# Patient Record
Sex: FEMALE | Race: WHITE | NOT HISPANIC OR LATINO | Employment: UNEMPLOYED | URBAN - METROPOLITAN AREA
[De-identification: names, ages, dates, MRNs, and addresses within clinical notes are randomized per-mention and may not be internally consistent; named-entity substitution may affect disease eponyms.]

---

## 2018-05-21 NOTE — PRE-PROCEDURE INSTRUCTIONS
Pre-Surgery Instructions:   Medication Instructions    norethindrone-ethinyl estradiol-iron (ESTROSTEP FE) 1-20/1-30/1-35 MG-MCG TABS Instructed patient per Anesthesia Guidelines      Pre op instructions reviewed-showering instructions using an antibacterial soap reviewed

## 2018-06-06 ENCOUNTER — ANESTHESIA EVENT (OUTPATIENT)
Dept: PERIOP | Facility: HOSPITAL | Age: 23
End: 2018-06-06
Payer: COMMERCIAL

## 2018-06-07 ENCOUNTER — HOSPITAL ENCOUNTER (OUTPATIENT)
Facility: HOSPITAL | Age: 23
Setting detail: OUTPATIENT SURGERY
Discharge: HOME | End: 2018-06-07
Attending: OTOLARYNGOLOGY | Admitting: OTOLARYNGOLOGY
Payer: COMMERCIAL

## 2018-06-07 ENCOUNTER — ANESTHESIA (OUTPATIENT)
Dept: PERIOP | Facility: HOSPITAL | Age: 23
End: 2018-06-07
Payer: COMMERCIAL

## 2018-06-07 VITALS
OXYGEN SATURATION: 96 % | HEART RATE: 85 BPM | TEMPERATURE: 98.5 F | SYSTOLIC BLOOD PRESSURE: 123 MMHG | RESPIRATION RATE: 18 BRPM | BODY MASS INDEX: 23.92 KG/M2 | HEIGHT: 62 IN | WEIGHT: 130 LBS | DIASTOLIC BLOOD PRESSURE: 70 MMHG

## 2018-06-07 DIAGNOSIS — J34.89 NASAL OBSTRUCTION: Primary | ICD-10-CM

## 2018-06-07 LAB — EXT PREGNANCY TEST URINE: NEGATIVE

## 2018-06-07 PROCEDURE — 81025 URINE PREGNANCY TEST: CPT | Performed by: STUDENT IN AN ORGANIZED HEALTH CARE EDUCATION/TRAINING PROGRAM

## 2018-06-07 RX ORDER — FENTANYL CITRATE/PF 50 MCG/ML
50 SYRINGE (ML) INJECTION
Status: DISCONTINUED | OUTPATIENT
Start: 2018-06-07 | End: 2018-06-07 | Stop reason: HOSPADM

## 2018-06-07 RX ORDER — MIDAZOLAM HYDROCHLORIDE 1 MG/ML
INJECTION INTRAMUSCULAR; INTRAVENOUS AS NEEDED
Status: DISCONTINUED | OUTPATIENT
Start: 2018-06-07 | End: 2018-06-07 | Stop reason: SURG

## 2018-06-07 RX ORDER — PROPOFOL 10 MG/ML
INJECTION, EMULSION INTRAVENOUS AS NEEDED
Status: DISCONTINUED | OUTPATIENT
Start: 2018-06-07 | End: 2018-06-07 | Stop reason: SURG

## 2018-06-07 RX ORDER — ACETAMINOPHEN 160 MG/5ML
650 SUSPENSION, ORAL (FINAL DOSE FORM) ORAL ONCE
Status: DISCONTINUED | OUTPATIENT
Start: 2018-06-07 | End: 2018-06-07 | Stop reason: HOSPADM

## 2018-06-07 RX ORDER — FENTANYL CITRATE 50 UG/ML
INJECTION, SOLUTION INTRAMUSCULAR; INTRAVENOUS AS NEEDED
Status: DISCONTINUED | OUTPATIENT
Start: 2018-06-07 | End: 2018-06-07 | Stop reason: SURG

## 2018-06-07 RX ORDER — ONDANSETRON 2 MG/ML
4 INJECTION INTRAMUSCULAR; INTRAVENOUS ONCE AS NEEDED
Status: DISCONTINUED | OUTPATIENT
Start: 2018-06-07 | End: 2018-06-07 | Stop reason: HOSPADM

## 2018-06-07 RX ORDER — PROMETHAZINE HYDROCHLORIDE 25 MG/ML
25 INJECTION, SOLUTION INTRAMUSCULAR; INTRAVENOUS ONCE AS NEEDED
Status: DISCONTINUED | OUTPATIENT
Start: 2018-06-07 | End: 2018-06-07 | Stop reason: HOSPADM

## 2018-06-07 RX ORDER — CEPHALEXIN 500 MG/1
500 CAPSULE ORAL 4 TIMES DAILY
Qty: 28 CAPSULE | Refills: 0 | Status: SHIPPED | OUTPATIENT
Start: 2018-06-07 | End: 2018-06-14

## 2018-06-07 RX ORDER — BUPIVACAINE HYDROCHLORIDE AND EPINEPHRINE 2.5; 5 MG/ML; UG/ML
INJECTION, SOLUTION INFILTRATION; PERINEURAL AS NEEDED
Status: DISCONTINUED | OUTPATIENT
Start: 2018-06-07 | End: 2018-06-07 | Stop reason: HOSPADM

## 2018-06-07 RX ORDER — ONDANSETRON 2 MG/ML
INJECTION INTRAMUSCULAR; INTRAVENOUS AS NEEDED
Status: DISCONTINUED | OUTPATIENT
Start: 2018-06-07 | End: 2018-06-07 | Stop reason: SURG

## 2018-06-07 RX ORDER — ROCURONIUM BROMIDE 10 MG/ML
INJECTION, SOLUTION INTRAVENOUS AS NEEDED
Status: DISCONTINUED | OUTPATIENT
Start: 2018-06-07 | End: 2018-06-07 | Stop reason: SURG

## 2018-06-07 RX ORDER — DIPHENHYDRAMINE HYDROCHLORIDE 50 MG/ML
12.5 INJECTION INTRAMUSCULAR; INTRAVENOUS ONCE AS NEEDED
Status: DISCONTINUED | OUTPATIENT
Start: 2018-06-07 | End: 2018-06-07 | Stop reason: HOSPADM

## 2018-06-07 RX ORDER — GLYCOPYRROLATE 0.2 MG/ML
INJECTION INTRAMUSCULAR; INTRAVENOUS AS NEEDED
Status: DISCONTINUED | OUTPATIENT
Start: 2018-06-07 | End: 2018-06-07 | Stop reason: SURG

## 2018-06-07 RX ORDER — LIDOCAINE HYDROCHLORIDE AND EPINEPHRINE 10; 10 MG/ML; UG/ML
INJECTION, SOLUTION INFILTRATION; PERINEURAL AS NEEDED
Status: DISCONTINUED | OUTPATIENT
Start: 2018-06-07 | End: 2018-06-07 | Stop reason: HOSPADM

## 2018-06-07 RX ORDER — GINSENG 100 MG
CAPSULE ORAL AS NEEDED
Status: DISCONTINUED | OUTPATIENT
Start: 2018-06-07 | End: 2018-06-07 | Stop reason: HOSPADM

## 2018-06-07 RX ORDER — OXYCODONE HCL 5 MG/5 ML
5 SOLUTION, ORAL ORAL EVERY 4 HOURS PRN
Status: DISCONTINUED | OUTPATIENT
Start: 2018-06-07 | End: 2018-06-07 | Stop reason: HOSPADM

## 2018-06-07 RX ORDER — OXYCODONE HYDROCHLORIDE 5 MG/1
5 TABLET ORAL EVERY 4 HOURS PRN
Qty: 30 TABLET | Refills: 0 | Status: SHIPPED | OUTPATIENT
Start: 2018-06-07 | End: 2018-06-17

## 2018-06-07 RX ORDER — METOCLOPRAMIDE HYDROCHLORIDE 5 MG/ML
INJECTION INTRAMUSCULAR; INTRAVENOUS AS NEEDED
Status: DISCONTINUED | OUTPATIENT
Start: 2018-06-07 | End: 2018-06-07 | Stop reason: SURG

## 2018-06-07 RX ORDER — SODIUM CHLORIDE 9 MG/ML
INJECTION, SOLUTION INTRAVENOUS CONTINUOUS PRN
Status: DISCONTINUED | OUTPATIENT
Start: 2018-06-07 | End: 2018-06-07 | Stop reason: SURG

## 2018-06-07 RX ADMIN — GLYCOPYRROLATE 0.4 MG: 0.2 INJECTION, SOLUTION INTRAMUSCULAR; INTRAVENOUS at 11:29

## 2018-06-07 RX ADMIN — CEFAZOLIN SODIUM 1000 MG: 1 SOLUTION INTRAVENOUS at 07:55

## 2018-06-07 RX ADMIN — FENTANYL CITRATE 100 MCG: 50 INJECTION INTRAMUSCULAR; INTRAVENOUS at 07:45

## 2018-06-07 RX ADMIN — LIDOCAINE HYDROCHLORIDE 100 MG: 20 INJECTION, SOLUTION INTRAVENOUS at 07:45

## 2018-06-07 RX ADMIN — ONDANSETRON 4 MG: 2 INJECTION INTRAMUSCULAR; INTRAVENOUS at 07:52

## 2018-06-07 RX ADMIN — ONDANSETRON 4 MG: 2 INJECTION INTRAMUSCULAR; INTRAVENOUS at 11:28

## 2018-06-07 RX ADMIN — NEOSTIGMINE METHYLSULFATE 2 MG: 1 INJECTION, SOLUTION INTRAMUSCULAR; INTRAVENOUS; SUBCUTANEOUS at 11:29

## 2018-06-07 RX ADMIN — ROCURONIUM BROMIDE 50 MG: 10 INJECTION INTRAVENOUS at 07:45

## 2018-06-07 RX ADMIN — MIDAZOLAM HYDROCHLORIDE 2 MG: 1 INJECTION, SOLUTION INTRAMUSCULAR; INTRAVENOUS at 07:38

## 2018-06-07 RX ADMIN — CEFAZOLIN SODIUM 1000 MG: 1 SOLUTION INTRAVENOUS at 11:22

## 2018-06-07 RX ADMIN — SODIUM CHLORIDE: 0.9 INJECTION, SOLUTION INTRAVENOUS at 07:41

## 2018-06-07 RX ADMIN — PROPOFOL 200 MG: 10 INJECTION, EMULSION INTRAVENOUS at 07:45

## 2018-06-07 RX ADMIN — METOCLOPRAMIDE HYDROCHLORIDE 10 MG: 5 INJECTION INTRAMUSCULAR; INTRAVENOUS at 11:28

## 2018-06-07 RX ADMIN — HYDROMORPHONE HYDROCHLORIDE 1 MG: 1 INJECTION, SOLUTION INTRAMUSCULAR; INTRAVENOUS; SUBCUTANEOUS at 11:52

## 2018-06-07 RX ADMIN — DEXAMETHASONE SODIUM PHOSPHATE 4 MG: 10 INJECTION INTRAMUSCULAR; INTRAVENOUS at 07:52

## 2018-06-07 NOTE — ANESTHESIA PREPROCEDURE EVALUATION
Review of Systems/Medical History  Patient summary reviewed        Cardiovascular  Negative cardio ROS    Pulmonary  Asthma (childhood) ,        GI/Hepatic  Negative GI/hepatic ROS          Negative  ROS        Endo/Other  Negative endo/other ROS      GYN  Negative gynecology ROS          Hematology  Negative hematology ROS      Musculoskeletal  Negative musculoskeletal ROS        Neurology  Negative neurology ROS      Psychology   Negative psychology ROS              Physical Exam    Airway    Mallampati score: I  TM Distance: >3 FB  Neck ROM: full     Dental       Cardiovascular  Comment: Negative ROS, Cardiovascular exam normal    Pulmonary  Pulmonary exam normal     Other Findings        Anesthesia Plan  ASA Score- 1     Anesthesia Type- general with ASA Monitors  Additional Monitors:   Airway Plan: ETT  Comment: tiva - prop/dino  Plan Factors-    Induction- intravenous  Postoperative Plan-     Informed Consent- Anesthetic plan and risks discussed with patient  I personally reviewed this patient with the CRNA  Discussed and agreed on the Anesthesia Plan with the CRNA  Karoline Wiley

## 2018-06-07 NOTE — DISCHARGE INSTRUCTIONS
[facility]  F[facility] Plastic [facility] Re[facility]ive Surgery   Rhinoplasty Post-Operative Care  Office [phone]  Cell [phone]               At Home (in the days immediately following the procedure):   Try to sleep with your head elevated on 2-3 pillows   You may experience moderate bleeding from the nose--this is normal  The gauze dressing under your nose may be changed as necessary   Iced packs placed over the eyes will help reduce swelling  They should be used 20 minutes on/ 20 minutes off while awake for the first full day  Crushed ice in ziplock bags or frozen peas or corn work well   The outside and half inch inside each nostril may be cleaned with a Q-tip soaked in hydrogen peroxide   Apply antibiotic ointment (Bacitracin) or Vaseline to the first half inch inside your nose and the external incision 3-4 times per day   Take your medicines as prescribed  REMEMBER:  DO NOT DRIVE WHILE TAKING PAIN MEDICATIONS   You may shower with luke-warm water only (avoid getting the cast wet)   Do not blow your nose for 7-10 days  If you need to sneeze, do so with an open mouth  You may get out of bed and go to the bathroom with assistance  Bleeding should decrease over the first 24 hours; you may have bleeding when changing positions quickly  You should keep the rigid dressing covering the bridge of your nose as dry as possible  Your eyes may even swell shut  Eat light, soft meals as tolerated, avoiding gas-stimulating foods  Follow-up care:   Eat before coming to the office for post-operative visits  Rest for the first week after the procedure, avoiding excessive physical activities, hard chewing, lifting objects over 8 lbs (about the weight of a phone book), or bending over     Be very gentle when brushing your upper teeth as they may be numb after surgery  We request that you do not travel by plane for one week after surgery    Lubricate your nose as directed with Q-tips and Vaseline to soften hardened crusts  You can expect some light blood-tinged drainage from your nose for several days  If your nose begins to bleed copiously, spray or instill Afrin (generic = Oxymetazoline HCL) nose drops into the nostril that is bleeding and wait  If the bleeding continues for 5 minutes or clots expel and/or you begin to swallow blood, please call our office or on call surgeon at the numbers below  If there is excessive pain, high fever (greater than 101  5oF), or if you injure your nose, please call our office or on call surgeon at the numbers below  Follow-up visits: At one week, the cast/splints will be removed; you may drive yourself to this appointment (as long as you are no longer taking prescription/narcotic pain medicines)  After cast removal, make-up may be worn, avoiding the incision lines  Additional follow-up visits will be scheduled at this time  Note that final results may not be apparent until [place] after surgery  Healing Care:   After the cast is removed, avoid striking or bumping your nose; try not to roll onto it while asleep  Clean the external nasal skin gently but thoroughly with soap  The use of alcohol and tobacco products prolong swelling and healing and are best avoided for 2 weeks after surgery  Do not expose your nose to sun for 4-6 weeks after surgery  Use sunscreen (SPF 30 or higher) for 6 months after surgery if sun exposure is absolutely necessary  Avoid any physical exercise that can cause over-heating or over-exertion for two weeks after the surgery  Your nose may be swollen and stuffy for several months  Complete healing may take 12 months  It is to your advantage to return for all postoperative visits so that long-term results may be evaluated      Remember:  DO NOT TAKE VITAMIN E, ASPIRIN, IBUPROFEN (advil, motrin, nuprin), NAPROSYN (naproxen, aleve) or any other so-called non-steroidal anti-inflammatory drugs (NSAIDS) for 2 weeks before and 2 weeks after surgery  Frequently Asked Questions:  When can I shower and shampoo my hair? You may shower the day after your surgery, BUT KEEP THE NASAL CAST DRY  This may mean you wash your face/hair in the sink instead  It is important that you do not use hot water, as this can increase the swelling  Lukewarm water is best       When will the swelling and bruising go away? This usually takes 7-10 days or so, but may take less or more time, depending on the individual     When can I take aspirin or advil? You should not take aspirin, advil (ibuprofen), alleve (naproxen) or any other non-steroidal anti-inflammatory drugs for 2 weeks prior to surgery or 2 weeks afterwards  The same is true for vitamin E, ginko, garlic pills, and other natural supplements  When can I wear my glasses? You will be able to wear contact lenses as soon as you feel comfortable  You may wear glasses one to two weeks after surgery, depending on the type of rhinoplasty you had  In any case, you must be careful not to place any pressure on the glasses (and thus your nose)  When can I wear makeup? Make-up can be applied after cast removal, but not directly on the incisions until 3 weeks after the procedure  When will I see my results? Final results in rhinoplasty can take 12 months  However, notable changes should be evident in the weeks immediately after the procedure  Typically, the 1-2 mm of residual swelling is what takes a variable amount of time to resolve, and can make subtle but significant differences in nasal shape  What about exercise? Please adhere to the following schedule:   Up to week 1 after surgery:  REST! No strenuous exercise  Walking is ok  Week 1-3 after surgery: You may begin light aerobic exercise, but no bending over/straining/lifting weights  Week 3+: You may begin more strenuous exercise, such as yoga, stretching, bending over, lifting weights    Please remember to start slowly  Week 6+: You may resume contact sports, such as soccer, basketball, etc    How to contact us:    Phone: If you have questions or concerns, please call us at [phone] during business hours (8 am to 5 pm)  On nights and weekends, you may page the ENT surgeon on call  at [facility]   In case of emergency, please call [phone]

## 2018-06-07 NOTE — ANESTHESIA POSTPROCEDURE EVALUATION
Post-Op Assessment Note      CV Status:  Stable    Mental Status:  Alert and awake    Hydration Status:  Euvolemic    PONV Controlled:  Controlled    Airway Patency:  Patent    Post Op Vitals Reviewed: Yes          Staff: CRNA, Anesthesiologist           BP   120/70   Temp   97 8   Pulse  106   Resp   10   SpO2   100

## 2018-06-07 NOTE — OP NOTE
OPERATIVE REPORT  PATIENT NAME: Paris Simmons    :  1995  MRN: [id number]  Pt Location: [facility]    SURGERY DATE: 2018    Surgeon(s) and Role:     * Fe Joseph MD - Primary    Preop Diagnosis:  Nasal valve collapse [J34 89]  Deviated septum [J34 2]  Nasal turbinate hypertrophy [J34 3]    Post-Op Diagnosis Codes:     * Nasal valve collapse [J34 89]     * Deviated septum [J34 2]     * Nasal turbinate hypertrophy [J34 3]    Procedure(s) (LRB):  REPAIR OF NASAL VESTIBULAR STENOSIS; SUBMUCOUS RESECTION OF TURBINATES (N/A)  SEPTOPLASTY (N/A)  RIB CARTILAGE AUTOLOGOUS (N/A)    Specimen(s):  * No specimens in log *    Estimated Blood Loss:   Minimal    Drains:       Anesthesia Type:   General    Operative Indications:  Nasal valve collapse [J34 89]  Deviated septum [J34 2]  Nasal turbinate hypertrophy [J34 3]      Operative Findings:  Severe deviation of the septum with fractured and depressed fractures  Absent septum posteriorly likely 2/2 previous surgery or destruction by the hematoma  Right rib cartilage taken from 11th rib  Complications:   None    Procedure and Technique:  PREOPERATIVE DIAGNOSES:  1  Nasal valve stenosis with obstruction  2  Septal deviation with nasal obstruction  3  Turbinate hypertrophy  4  Nasal deformity    POSTOPERATIVE DIAGNOSES:  Same    PROCEDURES:  1  Open repair of nasal stenosis  2  Septoplasty, revision  3  Rib cartilage graft to nose, autologous  4  Bilateral submucous resection of inferior turbinates  5  Rhinoplasty for repair of traumatic saddle nose deformity    SURGEON:  Mahendra Iraheta MD    ASSISTANTS: none    ANESTHESIA:  General     ESTIMATED BLOOD LOSS:  < 50 cc    FLUIDS:  Crystalloid    COMPLICATIONS:  None  INDICATIONS FOR PROCEDURE:  This is a 25y o  year old female whom I've seen previously in consultation regarding nasal obstruction and desire to change shape of the nose    Risks and benefits of the above procedures were discussed at length, including but not limited to bleeding, infection, external or internal nasal scars, septal perforation, change in the shape of the nose, undesired aesthetic outcome, nasal obstruction, among others  Patient understands and consents to proceed  BODY OF REPORT:   The patient was brought to the procedure room, placed on the procedure table in supine position  General  anesthesia was induced without complication  The table was rotated  The nose was injected with a mixture of 1% lidocaine with 1:100,000 of epinephrine in the tip, columella, dorsum, alar bases bilaterally, and septum bilaterally  An afrin-soaked pledget was placed in each nostril  The face was prepped and draped in the usual sterile fashion  The eyelids were gently taped sterilely after removing any prep from the lid skin  A standard timeout was taken to confirm patient name, procedures, sidedness, allergies, medications administered among others  A separate set of instruments was used for the rib harvest   All personnel used clean sterile gloves  A 5 cm incision was designed over the right ribs, and injected with the same lidocaine solution  A #15 blade was used to incise the skin, followed by electrocautery  Dissection was carried to the rib perichondrium  This was incised in an I-shape, corresponding the to amount to be harvested  A [place] was used to dissect in a sub-perichondrial plane circumferentially  Amie Rasmussen was used to release the rib, once one side was released with a #15 blade  The rib was removed and placed in saline  The wound was examined  It was irrigated with saline and Valsalva performed at 30 in for 10 seconds; no leak was noted  The wound was closed in layers with 3-0 Vicryl followed by a running 5-0 Monocryl in the skin in subcuticular fashion  The rib cartilage was concentrically carved and let sit in saline to determine if warping was to occur, only straight pieces were used      Submucous resection of the inferior turbinates was performed bilaterally as follows: The anterior portion of the inferior turbinate was incised using electrocautery  A submucoperiosteal dissection was performed using a [place] elevator  The anterior 1 5-2 cm of bone was removed with a Lisa Brumley forcep  Mucosa was preserved  The turbinate was outfractured using a [place] elevator  Septoplasty was performed as follows: A left-sided hemitransfixion incision was made with a #15 blade  A submucoperichondrial plane was elevated posteriorly to the bony septum, inferiorly to the floor of the nose, and superiorly to the dorsum  Dissection was performed around the anterocaudal septum in the same plane, and the opposite side was similarly exposed  Repair nasal stenosis was performed as follows: The septum was so severely deviated that anterior septal reconstruction (ASR) was required to repair it and nasal stenosis  The upper lateral cartilages were released from the septum using a D-knife or Danny elevator  The septum was incised from the keystone area anteriorly, preserving at least 1 5 cm of the keystone/bony junction  The dorsal support strut tapered to approximatley 1 0 cm at its most anterior point  The cartilage inferior to this was carefully removed by release along the bony-cartilaginous junction and maxillary crest   Bony deviations posterior to this were removed with care taken not to torque on the skull base  The septal cartilage was fashioned into an ASR graft such that it extended from the maxillary spine to the dorsal strut  The straightest possible portion was used  The maxillary spine was cleared using cautery and split to a depth of about 2-3 mm using a straight osteotome  The ASR graft was then sutured to the dorsal strut and remaining caudal septal strut on its left side using 5-0 prolene  This also acted as a  graft    The medial crura were repaired to the strut and tip support was found to be excellent  The upper lateral cartilages were repaired to the dorsum using the same suture  The upper lateral cartilages were released from the septum, using a D-knife   grafts, measuring approximately 1 5 cm long, 1 5 mm wide, and about 2-3 mm in height were created from the rib cartilage graft  This was inserted into right side, between the upper lateral cartilage and the septum and sutured using 6-0 prolene  Care was taken to preserve the native superoinferior positioning of the upper lateral cartilages in relation to the septum  Tongue-in-groove setback of the medial crura was achieved to support the tip and adjust rotation, using 5-0 Prolene  Camouflage grafts were placed along the dorsum to address the saddle nose deformity  She  was checked for contour and tip support  She  was found to have good contour and symmetry from all angles, including lateral, frontal and base view  Tip support was good  The hemitransfixion incision was closed using multiple interrupted 5-0 chromic sutures  The septal space was closed using a mattressed 4-0 plane gut suture on a [place] needle  The skin flap was replaced and contour verified  The columella was closed using multiple interrupted 6-0 Prolene sutures  Intranasal incisions were carefully sutured using  5-0 chromic gut suture  Bilateral Larson splints were placed, each coated in bacitracin  A 4-0 nylon was used to secure this through the septum anteriorly  An external thermoplastic splint was placed after taping the nose  The patient was returned to the care of Anesthesia, extubated without difficulty, and taken to the recovery area in stable condition  All instruments and sponge counts were correct at the end of the procedure  Ferny Sheth MD, was present for and performed all key elements of the procedure         I was present for the entire procedure and A qualified resident physician was not available    Patient Disposition:  PACU and extubated and stable    SIGNATURE: Jorge Roberts MD  DATE: June 7, 2018  TIME: 7:51 AM

## 2019-03-23 ENCOUNTER — OFFICE VISIT (OUTPATIENT)
Dept: URGENT CARE | Facility: CLINIC | Age: 24
End: 2019-03-23
Payer: COMMERCIAL

## 2019-03-23 VITALS
HEART RATE: 70 BPM | HEIGHT: 62 IN | TEMPERATURE: 98.7 F | BODY MASS INDEX: 26.68 KG/M2 | SYSTOLIC BLOOD PRESSURE: 131 MMHG | DIASTOLIC BLOOD PRESSURE: 73 MMHG | RESPIRATION RATE: 16 BRPM | WEIGHT: 145 LBS

## 2019-03-23 DIAGNOSIS — Z71.1 FEARED COMPLAINT WITHOUT DIAGNOSIS: Primary | ICD-10-CM

## 2019-03-23 PROCEDURE — G0382 LEV 3 HOSP TYPE B ED VISIT: HCPCS | Performed by: NURSE PRACTITIONER

## 2019-03-23 RX ORDER — ALBUTEROL SULFATE 90 UG/1
2 AEROSOL, METERED RESPIRATORY (INHALATION) EVERY 6 HOURS PRN
COMMUNITY

## 2019-03-23 NOTE — PROGRESS NOTES
[facility]        NAME: Jassi Romero is a 26 y o  female  : 1995    MRN: [id number]  DATE: 2019  TIME: 3:16 PM    Assessment and Plan   Feared complaint without diagnosis [Z71 1]  1  Feared complaint without diagnosis           Patient Instructions   1  Monitor what you are coming in contact with including foods and hygiene products  2  If symptoms return you can use benadryl or zyrtec  3  If you develop difficulty breathing go to the ER  4  Cool compresses to the eyes       Follow up with PCP in 3-5 days  Proceed to  ER if symptoms worsen  Chief Complaint     Chief Complaint   Patient presents with    Edema     woke up with both eyes swollen, h/a, no other symptoms, has subsided now         History of Present Illness       Patient is a 73-year-old female presenting with complaints of bilateral upper eyelid swelling and headache that started when she woke up this morning  She took Tylenol which improved the headache  Her eyelid swelling improved after a shower  She denies the ingestion or use of any new medications  Denies use of any new hygiene products, medications, or environmental exposures  Denies fever, sweats, rhinorrhea, ear pain, sore throat, SOB, or cough  She is concerned because a friend had similar symptoms and was diagnosed with mono  Review of Systems   Review of Systems   Constitutional: Negative for activity change, chills and fever  HENT: Positive for facial swelling  Negative for congestion, postnasal drip, rhinorrhea, sinus pressure, sinus pain and sore throat  Eyes: Negative for pain, redness and visual disturbance  Respiratory: Negative for cough and shortness of breath  Gastrointestinal: Negative for abdominal pain, diarrhea, nausea and vomiting  Musculoskeletal: Negative for back pain  Skin: Negative for rash  Neurological: Positive for headaches           Current Medications       Current Outpatient Medications:     albuterol (PROVENTIL HFA,VENTOLIN HFA) 90 mcg/act inhaler, Inhale 2 puffs every 6 (six) hours as needed for wheezing, Disp: , Rfl:     norethindrone-ethinyl estradiol-iron (ESTROSTEP FE) 1-20/1-30/1-35 MG-MCG TABS, Take 1 tablet by mouth daily in the early morning, Disp: , Rfl:     Current Allergies     Allergies as of 03/23/2019    (No Known Allergies)            The following portions of the patient's history were reviewed and updated as appropriate: allergies, current medications, past family history, past medical history, past social history, past surgical history and problem list      Past Medical History:   Diagnosis Date    Asthma     childhood       Past Surgical History:   Procedure Laterality Date    NASAL FRACTURE SURGERY      [place] REPAIR NASAL CAVITY STENOSIS N/A 6/7/2018    Procedure: REPAIR OF NASAL VESTIBULAR STENOSIS; SUBMUCOUS RESECTION OF TURBINATES;  Surgeon: Caro Green MD;  [facility];  Service: ENT    WI REPAIR OF NASAL SEPTUM N/A 6/7/2018    Procedure: SEPTOPLASTY;  Surgeon: Caro Green MD;  [facility];  Service: ENT       History reviewed  No pertinent family history  Medications have been verified  Objective   /73 (Patient Position: Sitting)   Pulse 70   Temp 98 7 °F (37 1 °C) (Temporal)   Resp 16   Ht 5' 2" (1 575 m)   Wt 65 8 kg (145 lb)   BMI 26 52 kg/m²        Physical Exam     Physical Exam   Constitutional: She is oriented to person, place, and time  Vital signs are normal  She appears well-developed and well-nourished  She is active  No distress  HENT:   Head: Normocephalic  Right Ear: Tympanic membrane, external ear and ear canal normal    Left Ear: Tympanic membrane, external ear and ear canal normal    Nose: Mucosal edema present  Mouth/Throat: Oropharynx is clear and moist and mucous membranes are normal  No oropharyngeal exudate, posterior oropharyngeal edema or posterior oropharyngeal erythema     Eyes: Pupils are equal, round, and reactive to light  Conjunctivae and lids are normal    Cardiovascular: Normal rate, regular rhythm, S1 normal, S2 normal and normal heart sounds  No murmur heard  Pulmonary/Chest: Breath sounds normal  No respiratory distress  She has no decreased breath sounds  She has no wheezes  She has no rhonchi  She has no rales  Neurological: She is alert and oriented to person, place, and time  GCS eye subscore is 4  GCS verbal subscore is 5  GCS motor subscore is 6  Skin: Skin is warm and dry

## 2019-03-23 NOTE — PATIENT INSTRUCTIONS
1  Monitor what you are coming in contact with including foods and hygiene products  2  If symptoms return you can use benadryl or zyrtec  3  If you develop difficulty breathing go to the ER  4  Cool compresses to the eyes     Allergies, Ambulatory Care   GENERAL INFORMATION:   Allergies  are an immune system reaction to a substance called an allergen  Your immune system sees the allergen as harmful and attacks it  Common symptoms include the following:   · Sneezing and runny, itchy, or stuffy nose    · Swollen, watery, or itchy eyes    · Itchy skin, mouth, ears, or throat    · Swelling, pain, or itch at the site of an insect sting  Seek immediate care for the following symptoms:   · Trouble swallowing or your throat or tongue is swollen    · Wheezing or trouble breathing    · Dizziness or feeling faint    · Chest pain or your heart is fluttering  Treatment for allergies  may include medicines to slow a serious allergic reaction  You may be given medicines that help decrease itching, sneezing, and swelling or help your nose feel less stuffy  Your healthcare provider may give you several different medicines to help decrease swelling, redness, and itching  Medicines may be given as pills, shots, or put directly on your skin  Nasal sprays or eye drops may also be used  Desensitization treatment may get your body used to allergens you cannot avoid  Your healthcare provider will give you a shot that contains a small amount of an allergen, giving a little more each time until your body gets used to it  Your healthcare provider will watch you closely and treat any allergic reaction you have  Your reaction to the allergen may be less serious after this treatment  Ask your healthcare provider how long you need to get the shots  Manage allergies:   · Use nasal rinses  Healthcare providers may suggest that you rinse your nasal passages with a saline solution  Daily rinsing may help clear your nose of allergens       · Do not smoke   Your allergy symptoms may decrease if you are not around smoke  If you smoke, it is never too late to quit  Ask your healthcare provider for information about how to stop if you need help quitting  · Carry medical alert identification  You may want to wear medical alert jewelry or carry a card that says you have an allergy  Ask your healthcare provider where to get medical alert identification  Prevent allergic reactions:   · Avoid seasonal allergic reactions  Do not go outside when pollen counts are high  Your symptoms may be better if you go outside only in the morning or evening  Use your air conditioner and change air filters often  · Dust and vacuum your home often  to avoid allergic reactions to dust, fur, or mold  You may want to wear a mask when you vacuum  Keep pets in certain rooms and bathe them often  Use a dehumidifier (machine that decreases moisture) to help prevent mold  · Do not use products that contain latex  if you have a latex allergy  Use nonlatex gloves if you work in healthcare or in food preparation  Always tell healthcare providers if you have a latex allergy  · Avoid insect stings  Stay away from areas or activities that increase your risk for being stung  These include trash cans, gardening, and picnics  Do not wear bright clothing or strong scents when you will be outside  Follow up with your healthcare provider as directed:  Write down your questions so you remember to ask them during your visits  CARE AGREEMENT:   You have the right to help plan your care  Learn about your health condition and how it may be treated  Discuss treatment options with your caregivers to decide what care you want to receive  You always have the right to refuse treatment  The above information is an  only  It is not intended as medical advice for individual conditions or treatments   Talk to your doctor, nurse or pharmacist before following any medical regimen to see if it is safe and effective for you  © 2014 [address] is for End User's use only and may not be sold, redistributed or otherwise used for commercial purposes  All illustrations and images included in CareNotes® are the copyrighted property of [employer]  or [employer]

## 2019-08-07 PROBLEM — J34.3 NASAL TURBINATE HYPERTROPHY: Status: RESOLVED | Noted: 2019-08-07 | Resolved: 2019-08-07

## 2019-08-07 PROBLEM — J34.2 NASAL SEPTAL DEVIATION: Status: ACTIVE | Noted: 2019-08-07

## 2019-08-07 PROBLEM — J34.3 NASAL TURBINATE HYPERTROPHY: Status: ACTIVE | Noted: 2019-08-07

## 2019-08-07 PROBLEM — J34.2 NASAL SEPTAL DEVIATION: Status: RESOLVED | Noted: 2019-08-07 | Resolved: 2019-08-07

## 2019-08-07 PROBLEM — J34.89 NASAL OBSTRUCTION: Status: ACTIVE | Noted: 2019-08-07

## 2019-08-20 ENCOUNTER — ANNUAL EXAM (OUTPATIENT)
Dept: OBGYN CLINIC | Facility: CLINIC | Age: 24
End: 2019-08-20
Payer: COMMERCIAL

## 2019-08-20 VITALS
WEIGHT: 138 LBS | BODY MASS INDEX: 24.45 KG/M2 | SYSTOLIC BLOOD PRESSURE: 112 MMHG | DIASTOLIC BLOOD PRESSURE: 74 MMHG | HEIGHT: 63 IN

## 2019-08-20 DIAGNOSIS — Z11.8 SCREENING FOR CHLAMYDIAL DISEASE: ICD-10-CM

## 2019-08-20 DIAGNOSIS — R87.610 ATYPICAL SQUAMOUS CELL CHANGES OF UNDETERMINED SIGNIFICANCE (ASCUS) ON CERVICAL CYTOLOGY WITH POSITIVE HIGH RISK HUMAN PAPILLOMA VIRUS (HPV): ICD-10-CM

## 2019-08-20 DIAGNOSIS — Z30.41 SURVEILLANCE FOR BIRTH CONTROL, ORAL CONTRACEPTIVES: ICD-10-CM

## 2019-08-20 DIAGNOSIS — R87.810 ATYPICAL SQUAMOUS CELL CHANGES OF UNDETERMINED SIGNIFICANCE (ASCUS) ON CERVICAL CYTOLOGY WITH POSITIVE HIGH RISK HUMAN PAPILLOMA VIRUS (HPV): ICD-10-CM

## 2019-08-20 DIAGNOSIS — Z01.419 ENCOUNTER FOR WELL WOMAN EXAM: Primary | ICD-10-CM

## 2019-08-20 DIAGNOSIS — Z11.3 SCREENING FOR STDS (SEXUALLY TRANSMITTED DISEASES): ICD-10-CM

## 2019-08-20 DIAGNOSIS — Z72.51 HIGH RISK HETEROSEXUAL BEHAVIOR: ICD-10-CM

## 2019-08-20 PROBLEM — J34.89 NASAL OBSTRUCTION: Status: RESOLVED | Noted: 2019-08-07 | Resolved: 2019-08-20

## 2019-08-20 PROCEDURE — 99395 PREV VISIT EST AGE 18-39: CPT | Performed by: PHYSICIAN ASSISTANT

## 2019-08-20 RX ORDER — LEVONORGESTREL AND ETHINYL ESTRADIOL 0.15-0.03
1 KIT ORAL DAILY
Qty: 90 TABLET | Refills: 4 | Status: SHIPPED | OUTPATIENT
Start: 2019-08-20

## 2019-08-20 NOTE — PROGRESS NOTES
Assessment/Plan:    No problem-specific Assessment & Plan notes found for this encounter  Diagnoses and all orders for this visit:    Encounter for well woman exam    Screening for STDs (sexually transmitted diseases)  -     GP PAP/CT/GC (Reflex HPV PLUS when ASC-US)    Screening for chlamydial disease  -     GP PAP/CT/GC (Reflex HPV PLUS when ASC-US)    Atypical squamous cell changes of undetermined significance (ASCUS) on cervical cytology with positive high risk human papilloma virus (HPV)  -     GP PAP/CT/GC (Reflex HPV PLUS when ASC-US)    High risk heterosexual behavior  -     GP PAP/CT/GC (Reflex HPV PLUS when ASC-US)    Surveillance for birth control, oral contraceptives  -     levonorgestrel-ethinyl estradiol (NORDETTE) 0 15-30 MG-MCG per tablet; Take 1 tablet by mouth daily          Subjective:      Patient ID: Cruz Loaiza is a 22 y o  female  Pt presents for her annual exam today--  She has no complaints  She has regular, light bleeding, no pelvic pain  Bowel and bladder are regular  No breast concerns today    pap today  H/o hpv  rx ocp        The following portions of the patient's history were reviewed and updated as appropriate: allergies, current medications, past family history, past medical history, past social history, past surgical history and problem list     Review of Systems   Constitutional: Negative for chills, fever and unexpected weight change  Gastrointestinal: Negative for abdominal pain, blood in stool, constipation and diarrhea  Genitourinary: Negative  Objective:      /74 (BP Location: Right arm, Patient Position: Sitting, Cuff Size: Standard)   Ht 5' 2 5" (1 588 m)   Wt 62 6 kg (138 lb)   LMP 08/14/2019 (Exact Date)   Breastfeeding? No   BMI 24 84 kg/m²          Physical Exam   Constitutional: She appears well-developed and well-nourished  HENT:   Head: Normocephalic and atraumatic  Neck: Normal range of motion     Pulmonary/Chest: Right breast exhibits no inverted nipple, no mass, no nipple discharge and no skin change  Left breast exhibits no inverted nipple, no mass, no nipple discharge and no skin change  Abdominal: Soft  Genitourinary: Vagina normal and uterus normal  Pelvic exam was performed with patient supine  There is no rash, tenderness or lesion on the right labia  There is no rash, tenderness or lesion on the left labia  Cervix exhibits no motion tenderness, no discharge and no friability  Right adnexum displays no mass, no tenderness and no fullness  Left adnexum displays no mass, no tenderness and no fullness  Lymphadenopathy: No inguinal adenopathy noted on the right or left side  Nursing note and vitals reviewed

## 2019-08-20 NOTE — PROGRESS NOTES
Patient is here for yearly exam  Patient is doing well on [place] birth control  She is having regular cycles  She has no breast concerns and B&B working well  pap ASCUS, Gc/chlamydia negative, HPV pos 8/15/18  Gc/chlamydia neg culture 7/31/17

## 2019-08-25 LAB
DEPRECATED C TRACH RRNA XXX QL PRB: NOT DETECTED
N GONORRHOEA DNA UR QL NAA+PROBE: NOT DETECTED
THIN PREP CVX: NORMAL

## 2020-09-17 ENCOUNTER — ANNUAL EXAM (OUTPATIENT)
Dept: OBGYN CLINIC | Facility: CLINIC | Age: 25
End: 2020-09-17
Payer: COMMERCIAL

## 2020-09-17 VITALS
HEIGHT: 62 IN | SYSTOLIC BLOOD PRESSURE: 122 MMHG | DIASTOLIC BLOOD PRESSURE: 70 MMHG | BODY MASS INDEX: 26.13 KG/M2 | WEIGHT: 142 LBS

## 2020-09-17 DIAGNOSIS — Z12.39 ENCOUNTER FOR SCREENING BREAST EXAMINATION: ICD-10-CM

## 2020-09-17 DIAGNOSIS — Z01.419 ENCOUNTER FOR WELL WOMAN EXAM: Primary | ICD-10-CM

## 2020-09-17 DIAGNOSIS — Z30.41 SURVEILLANCE FOR BIRTH CONTROL, ORAL CONTRACEPTIVES: ICD-10-CM

## 2020-09-17 DIAGNOSIS — Z11.3 SCREENING EXAMINATION FOR STD (SEXUALLY TRANSMITTED DISEASE): ICD-10-CM

## 2020-09-17 DIAGNOSIS — Z72.51 HIGH RISK HETEROSEXUAL BEHAVIOR: ICD-10-CM

## 2020-09-17 DIAGNOSIS — Z01.419 CERVICAL SMEAR, AS PART OF ROUTINE GYNECOLOGICAL EXAMINATION: ICD-10-CM

## 2020-09-17 DIAGNOSIS — Z11.8 SPECIAL SCREENING EXAMINATION FOR CHLAMYDIAL DISEASE: ICD-10-CM

## 2020-09-17 PROCEDURE — 99395 PREV VISIT EST AGE 18-39: CPT | Performed by: PHYSICIAN ASSISTANT

## 2020-09-17 RX ORDER — DIPHENOXYLATE HYDROCHLORIDE AND ATROPINE SULFATE 2.5; .025 MG/1; MG/1
1 TABLET ORAL DAILY
COMMUNITY

## 2020-09-17 RX ORDER — LEVONORGESTREL AND ETHINYL ESTRADIOL 0.15-0.03
1 KIT ORAL DAILY
Qty: 90 TABLET | Refills: 4 | Status: SHIPPED | OUTPATIENT
Start: 2020-09-17

## 2020-09-17 NOTE — PROGRESS NOTES
Patient is here for yearly exam  Patient is doing well on [place] (Nordette)birth control  She is having regular cycles, no breast concerns and B&B ok  Patient is due for a pap smear and GC/Chlamydia cultures at this visit  8/20/19 Normal Pap     8/15/18 ASCUS,Negative HPV, Negative GC/Chlamydia  7/31/17 Negative GC/Chlamydia

## 2020-09-17 NOTE — PROGRESS NOTES
Assessment/Plan:    No problem-specific Assessment & Plan notes found for this encounter  Diagnoses and all orders for this visit:    Encounter for well woman exam    Cervical smear, as part of routine gynecological examination  -     GP PAP/CT/GC (Reflex HPV PLUS when ASC-US)    Screening examination for STD (sexually transmitted disease)  -     GP PAP/CT/GC (Reflex HPV PLUS when ASC-US)    Special screening examination for chlamydial disease  -     GP PAP/CT/GC (Reflex HPV PLUS when ASC-US)    High risk heterosexual behavior  -     GP PAP/CT/GC (Reflex HPV PLUS when ASC-US)    Surveillance for birth control, oral contraceptives  -     levonorgestrel-ethinyl estradiol (NORDETTE) 0 15-30 MG-MCG per tablet; Take 1 tablet by mouth daily    Encounter for screening breast examination    Other orders  -     multivitamin (THERAGRAN) TABS; Take 1 tablet by mouth daily          Subjective:      Patient ID: Vijaya Naranjo is a 26 y o  female  Pt presents for her annual exam today--  She has no complaints  She has regular bleeding, no pelvic pain--on OCP  Bowel and bladder are regular  No breast concerns today      pap today  rx nordette  Daily mvi      The following portions of the patient's history were reviewed and updated as appropriate: allergies, current medications, past family history, past medical history, past social history, past surgical history and problem list     Review of Systems   Constitutional: Negative for chills, fever and unexpected weight change  Gastrointestinal: Negative for abdominal pain, blood in stool, constipation and diarrhea  Genitourinary: Negative  Objective:      /70   Ht 5' 2" (1 575 m)   Wt 64 4 kg (142 lb)   LMP 08/17/2020 (Within Days)   Breastfeeding No   BMI 25 97 kg/m²          Physical Exam  Vitals signs and nursing note reviewed  Constitutional:       Appearance: She is well-developed  HENT:      Head: Normocephalic and atraumatic     Neck: Musculoskeletal: Normal range of motion  Chest:      Breasts:         Right: No inverted nipple, mass, nipple discharge or skin change  Left: No inverted nipple, mass, nipple discharge or skin change  Abdominal:      Palpations: Abdomen is soft  Genitourinary:     Exam position: Supine  Labia:         Right: No rash, tenderness or lesion  Left: No rash, tenderness or lesion  Vagina: Normal       Cervix: No cervical motion tenderness, discharge or friability  Adnexa:         Right: No mass, tenderness or fullness  Left: No mass, tenderness or fullness  Lymphadenopathy:      Lower Body: No right inguinal adenopathy  No left inguinal adenopathy

## 2020-09-23 DIAGNOSIS — B37.9 YEAST INFECTION: Primary | ICD-10-CM

## 2020-12-23 ENCOUNTER — OFFICE VISIT (OUTPATIENT)
Dept: OBGYN CLINIC | Facility: CLINIC | Age: 25
End: 2020-12-23
Payer: COMMERCIAL

## 2020-12-23 VITALS
HEIGHT: 62 IN | BODY MASS INDEX: 26.68 KG/M2 | SYSTOLIC BLOOD PRESSURE: 120 MMHG | WEIGHT: 145 LBS | DIASTOLIC BLOOD PRESSURE: 82 MMHG

## 2020-12-23 DIAGNOSIS — R10.2 VAGINAL PAIN: Primary | ICD-10-CM

## 2020-12-23 DIAGNOSIS — N72 CERVICITIS: ICD-10-CM

## 2020-12-23 DIAGNOSIS — A60.9 HSV (HERPES SIMPLEX VIRUS) ANOGENITAL INFECTION: ICD-10-CM

## 2020-12-23 DIAGNOSIS — N89.8 ITCHING OF VAGINA: ICD-10-CM

## 2020-12-23 DIAGNOSIS — N76.0 ACUTE VAGINITIS: ICD-10-CM

## 2020-12-23 DIAGNOSIS — Z11.3 SCREENING FOR STDS (SEXUALLY TRANSMITTED DISEASES): ICD-10-CM

## 2020-12-23 DIAGNOSIS — B37.49 GENITAL CANDIDIASIS: ICD-10-CM

## 2020-12-23 PROCEDURE — 99213 OFFICE O/P EST LOW 20 MIN: CPT | Performed by: PHYSICIAN ASSISTANT

## 2020-12-23 RX ORDER — VALACYCLOVIR HYDROCHLORIDE 1 G/1
1000 TABLET, FILM COATED ORAL 2 TIMES DAILY
Qty: 20 TABLET | Refills: 0 | Status: SHIPPED | OUTPATIENT
Start: 2020-12-23 | End: 2021-01-02

## 2021-01-03 LAB
A VAGINAE DNA VAG NAA+PROBE-LOG#: <3.25
A VAGINAE DNA VAG QL NAA+PROBE: NOT DETECTED
BVAB2 DNA VAG QL NAA+PROBE: DETECTED
C TRACH DNA SPEC QL PROBE+SIG AMP: NOT DETECTED
G VAGINALIS DNA SPEC QL NAA+PROBE: DETECTED
G VAGINALIS DNA VAG NAA+PROBE-LOG#: ABNORMAL
HSV1 DNA SPEC QL NAA+PROBE: DETECTED
HSV2 DNA SPEC QL NAA+PROBE: NOT DETECTED
LACTOBACILLUS DNA VAG NAA+PROBE-LOG#: <3.25
LACTOBACILLUS DNA VAG NAA+PROBE-LOG#: NOT DETECTED
M GENITALIUM DNA SPEC QL NAA+PROBE: NOT DETECTED
MEGA1 DNA VAG QL NAA+PROBE: DETECTED
N GONORRHOEA DNA SPEC QL NAA+PROBE: NOT DETECTED
SL AMB C.ALBICANS BY PCR: NOT DETECTED
SL AMB CANDIDA GENUS: NOT DETECTED
T VAGINALIS RRNA SPEC QL NAA+PROBE: NOT DETECTED

## 2021-01-04 DIAGNOSIS — N76.0 BV (BACTERIAL VAGINOSIS): Primary | ICD-10-CM

## 2021-01-04 DIAGNOSIS — B96.89 BV (BACTERIAL VAGINOSIS): Primary | ICD-10-CM

## 2021-01-04 RX ORDER — METRONIDAZOLE 7.5 MG/G
1 GEL VAGINAL
Qty: 70 G | Refills: 0 | Status: SHIPPED | OUTPATIENT
Start: 2021-01-04 | End: 2021-01-09

## 2021-01-04 NOTE — TELEPHONE ENCOUNTER
The patient was advised of results  The patient was given valtrex when she was in for her visit  The order for metrogel was put in to treat BV

## 2021-10-06 ENCOUNTER — ANNUAL EXAM (OUTPATIENT)
Dept: OBGYN CLINIC | Facility: CLINIC | Age: 26
End: 2021-10-06
Payer: COMMERCIAL

## 2021-10-06 VITALS
HEIGHT: 62 IN | BODY MASS INDEX: 27.71 KG/M2 | DIASTOLIC BLOOD PRESSURE: 60 MMHG | WEIGHT: 150.6 LBS | SYSTOLIC BLOOD PRESSURE: 102 MMHG

## 2021-10-06 DIAGNOSIS — Z30.41 SURVEILLANCE FOR BIRTH CONTROL, ORAL CONTRACEPTIVES: ICD-10-CM

## 2021-10-06 DIAGNOSIS — Z01.419 ENCOUNTER FOR WELL WOMAN EXAM: Primary | ICD-10-CM

## 2021-10-06 DIAGNOSIS — Z11.3 SCREENING EXAMINATION FOR STD (SEXUALLY TRANSMITTED DISEASE): ICD-10-CM

## 2021-10-06 DIAGNOSIS — Z12.39 ENCOUNTER FOR SCREENING BREAST EXAMINATION: ICD-10-CM

## 2021-10-06 DIAGNOSIS — Z11.8 SPECIAL SCREENING EXAMINATION FOR CHLAMYDIAL DISEASE: ICD-10-CM

## 2021-10-06 DIAGNOSIS — Z72.51 HIGH RISK HETEROSEXUAL BEHAVIOR: ICD-10-CM

## 2021-10-06 PROCEDURE — 99395 PREV VISIT EST AGE 18-39: CPT | Performed by: PHYSICIAN ASSISTANT

## 2021-10-06 RX ORDER — LEVONORGESTREL AND ETHINYL ESTRADIOL 0.15-0.03
1 KIT ORAL DAILY
Qty: 90 TABLET | Refills: 4 | Status: SHIPPED | OUTPATIENT
Start: 2021-10-06

## 2021-10-07 LAB
DEPRECATED C TRACH RRNA XXX QL PRB: NOT DETECTED
N GONORRHOEA DNA UR QL NAA+PROBE: NOT DETECTED

## 2022-10-13 ENCOUNTER — ANNUAL EXAM (OUTPATIENT)
Dept: GYNECOLOGY | Facility: CLINIC | Age: 27
End: 2022-10-13
Payer: COMMERCIAL

## 2022-10-13 VITALS
HEIGHT: 62 IN | DIASTOLIC BLOOD PRESSURE: 80 MMHG | SYSTOLIC BLOOD PRESSURE: 130 MMHG | BODY MASS INDEX: 30.55 KG/M2 | WEIGHT: 166 LBS

## 2022-10-13 DIAGNOSIS — Z12.39 ENCOUNTER FOR SCREENING BREAST EXAMINATION: ICD-10-CM

## 2022-10-13 DIAGNOSIS — Z30.41 SURVEILLANCE FOR BIRTH CONTROL, ORAL CONTRACEPTIVES: ICD-10-CM

## 2022-10-13 DIAGNOSIS — Z01.419 ENCOUNTER FOR WELL WOMAN EXAM: Primary | ICD-10-CM

## 2022-10-13 PROCEDURE — 99395 PREV VISIT EST AGE 18-39: CPT | Performed by: PHYSICIAN ASSISTANT

## 2022-10-13 RX ORDER — LEVONORGESTREL AND ETHINYL ESTRADIOL 0.15-0.03
1 KIT ORAL DAILY
Qty: 90 TABLET | Refills: 4 | Status: SHIPPED | OUTPATIENT
Start: 2022-10-13

## 2022-10-18 NOTE — PROGRESS NOTES
Assessment/Plan:    No problem-specific Assessment & Plan notes found for this encounter  Diagnoses and all orders for this visit:    Encounter for well woman exam    Surveillance for birth control, oral contraceptives  -     levonorgestrel-ethinyl estradiol (Levora 0 15/30, 28,) 0 15-30 MG-MCG per tablet; Take 1 tablet by mouth daily    Encounter for screening breast examination          Subjective:      Patient ID: Lauren Padilla is a 33 y o  female  Pt presents for her annual exam today--  She has no complaints  She has reg bleeding  no pelvic pain  On OCP  Bowel and bladder are regular  No breast concerns today    No pap today  rx levora  Daily mvi      The following portions of the patient's history were reviewed and updated as appropriate: allergies, current medications, past family history, past medical history, past social history, past surgical history and problem list     Review of Systems   Constitutional: Negative for chills, fever and unexpected weight change  Gastrointestinal: Negative for abdominal pain, blood in stool, constipation and diarrhea  Genitourinary: Negative  Objective:      /80   Ht 5' 2" (1 575 m)   Wt 75 3 kg (166 lb)   LMP 09/22/2022 (Exact Date)   BMI 30 36 kg/m²          Physical Exam  Vitals and nursing note reviewed  Constitutional:       Appearance: She is well-developed  HENT:      Head: Normocephalic and atraumatic  Chest:   Breasts:      Right: No inverted nipple, mass, nipple discharge or skin change  Left: No inverted nipple, mass, nipple discharge or skin change  Abdominal:      Palpations: Abdomen is soft  Genitourinary:     Exam position: Supine  Labia:         Right: No rash, tenderness or lesion  Left: No rash, tenderness or lesion  Vagina: Normal       Cervix: No cervical motion tenderness, discharge or friability  Adnexa:         Right: No mass, tenderness or fullness            Left: No mass, tenderness or fullness  Musculoskeletal:      Cervical back: Normal range of motion  Lymphadenopathy:      Lower Body: No right inguinal adenopathy  No left inguinal adenopathy

## 2023-12-05 ENCOUNTER — ANNUAL EXAM (OUTPATIENT)
Dept: GYNECOLOGY | Facility: CLINIC | Age: 28
End: 2023-12-05
Payer: COMMERCIAL

## 2023-12-05 VITALS
WEIGHT: 158.2 LBS | HEIGHT: 62 IN | DIASTOLIC BLOOD PRESSURE: 78 MMHG | SYSTOLIC BLOOD PRESSURE: 122 MMHG | BODY MASS INDEX: 29.11 KG/M2

## 2023-12-05 DIAGNOSIS — Z01.419 ROUTINE GYNECOLOGICAL EXAMINATION: ICD-10-CM

## 2023-12-05 DIAGNOSIS — Z12.39 ENCOUNTER FOR SCREENING BREAST EXAMINATION: ICD-10-CM

## 2023-12-05 DIAGNOSIS — Z01.419 ENCOUNTER FOR WELL WOMAN EXAM: Primary | ICD-10-CM

## 2023-12-05 DIAGNOSIS — Z30.41 SURVEILLANCE FOR BIRTH CONTROL, ORAL CONTRACEPTIVES: ICD-10-CM

## 2023-12-05 DIAGNOSIS — Z12.4 CERVICAL CANCER SCREENING: ICD-10-CM

## 2023-12-05 PROCEDURE — 99395 PREV VISIT EST AGE 18-39: CPT | Performed by: PHYSICIAN ASSISTANT

## 2023-12-05 RX ORDER — LEVONORGESTREL AND ETHINYL ESTRADIOL 0.15-0.03
1 KIT ORAL DAILY
Qty: 90 TABLET | Refills: 1 | Status: SHIPPED | OUTPATIENT
Start: 2023-12-05

## 2023-12-05 NOTE — PROGRESS NOTES
Assessment/Plan:    No problem-specific Assessment & Plan notes found for this encounter. Diagnoses and all orders for this visit:    Encounter for well woman exam    Encounter for screening breast examination    Cervical cancer screening    Routine gynecological examination  -     GP PAP (RFLX HPV Plus whenASC-US)    Surveillance for birth control, oral contraceptives  -     levonorgestrel-ethinyl estradiol (Levora 0.15/30, 28,) 0.15-30 MG-MCG per tablet; Take 1 tablet by mouth daily          Subjective:      Patient ID: David Dean is a 33 y.o. female. Pt presents for her annual exam today--  She has no complaints  She has regular bleeding  no pelvic pain  On OCP, but thinking of d/c this year to take a break  Bowel and bladder are regular  No breast concerns today    pap today. Rx Levora  Nsaids prn  Daily mvi        The following portions of the patient's history were reviewed and updated as appropriate: allergies, current medications, past family history, past medical history, past social history, past surgical history, and problem list.    Review of Systems   Constitutional:  Negative for chills, fever and unexpected weight change. HENT:  Negative for ear pain and sore throat. Eyes:  Negative for pain and visual disturbance. Respiratory:  Negative for cough and shortness of breath. Cardiovascular:  Negative for chest pain and palpitations. Gastrointestinal:  Negative for abdominal pain, blood in stool, constipation, diarrhea and vomiting. Genitourinary: Negative. Negative for dysuria and hematuria. Musculoskeletal:  Negative for arthralgias and back pain. Skin:  Negative for color change and rash. Neurological:  Negative for seizures and syncope. All other systems reviewed and are negative. Objective:      /78   Ht 5' 2" (1.575 m)   Wt 71.8 kg (158 lb 3.2 oz)   BMI 28.94 kg/m²          Physical Exam  Vitals and nursing note reviewed.    Constitutional: Appearance: She is well-developed. HENT:      Head: Normocephalic and atraumatic. Chest:   Breasts:     Right: No inverted nipple, mass, nipple discharge or skin change. Left: No inverted nipple, mass, nipple discharge or skin change. Abdominal:      Palpations: Abdomen is soft. Genitourinary:     Exam position: Supine. Labia:         Right: No rash, tenderness or lesion. Left: No rash, tenderness or lesion. Vagina: Normal.      Cervix: No cervical motion tenderness, discharge or friability. Uterus: Normal.       Adnexa: Right adnexa normal and left adnexa normal.        Right: No mass, tenderness or fullness. Left: No mass, tenderness or fullness. Musculoskeletal:      Cervical back: Normal range of motion. Lymphadenopathy:      Lower Body: No right inguinal adenopathy. No left inguinal adenopathy.

## 2023-12-10 LAB — THIN PREP CVX: NORMAL

## (undated) DEVICE — NEURO PATTIES 1/2 X 3

## (undated) DEVICE — SKIN MARKER DUAL TIP WITH RULER CAP, FLEXIBLE RULER AND LABELS: Brand: DEVON

## (undated) DEVICE — 3M™ STERI-STRIP™ COMPOUND BENZOIN TINCTURE 40 BAGS/CARTON 4 CARTONS/CASE C1544: Brand: 3M™ STERI-STRIP™

## (undated) DEVICE — GLOVE INDICATOR PI UNDERGLOVE SZ 7.5 BLUE

## (undated) DEVICE — MICRODISSECTION NEEDLE STRAIGHT SLEEVE: Brand: COLORADO

## (undated) DEVICE — SPECIMEN CONTAINER STERILE PEEL PACK

## (undated) DEVICE — SUT PLAIN 4-0 SC-1 18 IN 1828H

## (undated) DEVICE — BUTTON SWITCH PENCIL HOLSTER: Brand: VALLEYLAB

## (undated) DEVICE — SUT PROLENE 6-0 P-3 18 IN 8695G

## (undated) DEVICE — GAUZE SPONGES,16 PLY: Brand: CURITY

## (undated) DEVICE — SUT CHROMIC 5-0 P-3 18 IN 687G

## (undated) DEVICE — POV-IOD SOLUTION 4OZ BT

## (undated) DEVICE — DRAPE EQUIPMENT RF WAND

## (undated) DEVICE — IV CATH 18 G X 1.16 IN

## (undated) DEVICE — STRL COTTON TIP APPLCTR 6IN PK: Brand: CARDINAL HEALTH

## (undated) DEVICE — BLADE BEAVER MINI SZ 67

## (undated) DEVICE — 3M™ STERI-STRIP™ REINFORCED ADHESIVE SKIN CLOSURES, R1547, 1/2 IN X 4 IN (12 MM X 100 MM), 6 STRIPS/ENVELOPE: Brand: 3M™ STERI-STRIP™

## (undated) DEVICE — SUT PROLENE 5-0 P-3 18 IN 8698G

## (undated) DEVICE — SPLINT 1524050 5PK PAIR DOYLE II AIRWAY: Brand: DOYLE II ™

## (undated) DEVICE — SUT ETHILON 4-0 PS-2 18 IN 1667H

## (undated) DEVICE — UNDYED MONOFILAMENT (POLYDIOXANONE), ABSORBABLE SYNTHETIC SURGICAL SUTURE: Brand: PDS

## (undated) DEVICE — GLOVE SRG BIOGEL 7.5

## (undated) DEVICE — REM POLYHESIVE ADULT PATIENT RETURN ELECTRODE: Brand: VALLEYLAB

## (undated) DEVICE — NEEDLE 27 G X 1 1/4

## (undated) DEVICE — TRAY FOLEY 16FR URIMETER SURESTEP

## (undated) DEVICE — Device

## (undated) DEVICE — STERILE NASAL PACK: Brand: CARDINAL HEALTH

## (undated) DEVICE — SPLINT 1529000 10PK THERMASPLINT SMALL

## (undated) DEVICE — INSULATED BLADE ELECTRODE: Brand: EDGE

## (undated) DEVICE — INTENDED FOR TISSUE SEPARATION, AND OTHER PROCEDURES THAT REQUIRE A SHARP SURGICAL BLADE TO PUNCTURE OR CUT.: Brand: BARD-PARKER ® CARBON RIB-BACK BLADES

## (undated) DEVICE — SYRINGE 10ML LL CONTROL TOP

## (undated) DEVICE — BASIC SINGLE BASIN 2-LF: Brand: MEDLINE INDUSTRIES, INC.